# Patient Record
(demographics unavailable — no encounter records)

---

## 2017-03-08 NOTE — MM
Reason for exam: screening  (asymptomatic).

Last mammogram was performed 1 year and 1 month ago.



History:

Family history of premenopausal breast cancer in maternal cousin at age 41, breast

cancer in maternal cousin at age 41, and premenopausal breast cancer in maternal 

grandmother at age 40.

Took hormonal contraceptives for 2 years.



Physical Findings:

A clinical breast exam by your physician is recommended on an annual basis and 

results should be correlated with mammographic findings.



MG Screening Mammo w CAD

Bilateral CC and MLO view(s) were taken.

Prior study comparison: January 29, 2016, bilateral MG screening mammo w CAD.  

December 9, 2014, bilateral MG screening mammo w CAD.

The breast tissue is heterogeneously dense. This may lower the sensitivity of 

mammography.  There is chronic nodularity bilaterally.  No significant changes 

when compared with prior studies.





ASSESSMENT: Benign, BI-RAD 2



RECOMMENDATION:

Routine screening mammogram of both breasts in 1 year.

## 2017-08-02 NOTE — P.PCN
Date of Procedure: 08/02/17


Preoperative Diagnosis: 





Postoperative Diagnosis: 





Procedure(s) Performed: 


BRIEF HISTORY: Patient is a 50-year-old pleasant white female, scheduled for an 

elective colonoscopy as a part of screening for colorectal neoplasia.





PROCEDURE PERFORMED: Colonoscopy. 





PREOPERATIVE DIAGNOSIS: Screening for colon cancer. 





IV sedation per Anesthesia. 





PROCEDURE: After informed consent was obtained, the patient, was brought into 

the endoscopy unit. IV sedation was administered by Anesthesia under continuous 

monitoring.  Digital rectal examination was normal. Initially the Olympus CF-

160 flexible video colonoscope was then inserted in the rectum, gradually 

advanced into the cecum without any difficulty. Careful examination was 

performed as the scope was gradually being withdrawn. Ileocecal valve and the 

appendiceal orifice were visualized and appeared normal.  Prep was excellent. 

Mucosa of the cecum, ascending colon, transverse colon, descending colon, 

sigmoid colon, and rectum appeared normal. Retroflexion was performed in the 

rectum and no lesions were seen. The patient tolerated the procedure well. 





IMPRESSION: 


Normal-appearing colon from rectum to cecum with no evidence of colorectal 

neoplasia.





RECOMMENDATIONS:  Findings of this examination were discussed with the patient 

as well as her family.  She was advised to have a repeat screening colonoscopy 

in 10 years





Implants: 





Indications for Procedure: 





Operative Findings: 





Description of Procedure:

## 2018-04-04 NOTE — BD
EXAMINATION TYPE: MG DEXA axial skeleton.  

 

DATE OF EXAM: 4/4/2018

 

COMPARISON: NONE

 

CLINICAL HISTORY:

 

Height:  5 FT 5 IN

Weight:  169

 

FRAX RISK QUESTIONS:

Alcohol (3 or more units per day):  NO

Family History (Parent hip fracture):  NO

Glucocorticoids (More than 3mos):  NO

           (Ex: prednisone, prednisolone, methylprednisolone, dexamethasone, and hydrocortisone).    
     

History of Fracture in Adulthood: NO

Secondary Osteoporosis:

  1.  Type 1 Diabetes: NO

  2.  Hyperthyroidism: NO

  3.  Menopause before 45: NO

  4.  Malnutrition: NO

  5.  Chronic liver disease: NO

Rheumatoid Arthritis: NO

Current Tobacco Use: NO

 

RISK FACTORS 

HISTORY OF: 

Active: YES

Postmenopausal woman: AGE 50

 

MEDICATIONS: 

Additional Medications: VIT D ,LISINOPRIL, MECLIZINE

 

 

Additional History: VERTIGO

 

 

EXAM MEASUREMENTS: 

Bone mineral densitometry was performed using the [employer] System.

Bone mineral density as measured about the Lumbar spine is:  

----- L1-L4(G/cm2): 0.999

T Score Values are as follows:

----- L2: -1.6

----- L3: -1.3

----- L4: -1.4

----- L1-L4: -1.5

BASELINE

 

Bone mineral density about the R hip (g/cm2): 0.884

Bone mineral density about the L hip (g/cm2): 0.866

T Score values are as follows:

-----R Neck: -1.1

-----L Neck: -1.2

-----R Total: -0.6

-----L Total: -1.0

BASELINE

 

IMPRESSION:

Osteopenia (T Score between -2.5 and -1).

 

There is slightly increased risk of fracture and the patient may be considered 

for treatment. 

 

Re-Screen 2-5 years.

 

 

 

 

 

NOTE:  T-SCORE=SD OF THE YOUNG ADULT MEAN.

## 2018-04-06 NOTE — ECHOF
Referral Reason:R94.31 Abnormal EKG



MEASUREMENTS

--------

HEIGHT: 167.6 cm

WEIGHT: 77.1 kg

BP: 

RVIDd:   2.7 cm     (< 3.3)

IVSd:   1.0 cm     (0.6 - 1.1)

LVIDd:   4.3 cm     (3.9 - 5.3)

LVPWd:   1.0 cm     (0.6 - 1.1)

IVSs:   1.6 cm

LVIDs:   2.4 cm

LVPWs:   1.6 cm

LAESV Index (A-L):   11.16 ml/m

Ao Diam:   2.7 cm     (2.0 - 3.7)

AV Cusp:   1.8 cm     (1.5 - 2.6)

LA Diam:   2.3 cm     (2.7 - 3.8)

MV E Barber:   0.51 m/s

MV DecT:   291 ms

MV A Barber:   0.80 m/s

MV E/A Ratio:   0.64 

RAP:   5.00 mmHg

RVSP:   11.21 mmHg







FINDINGS

--------

Sinus rhythm.

This was a technically adequate study.

The left ventricular size is normal.   Left ventricular wall thickness is normal.   Overall left vent
ricular systolic function is normal with, an EF between 55 - 60 %.

The right ventricle is normal in size and function.

Normal LA  size by volume 22+/-6 ml/m2.

The right atrium is normal in size.

The aortic valve is trileaflet, and appears structurally normal. No aortic stenosis or regurgitation.


The mitral valve is normal.   There is trace to mild mitral regurgitation.

Trace tricuspid regurgitation present.   Right ventricular systolic pressure is normal at < 35 mmHg. 
  There is no evidence of pulmonary hypertension.

The pulmonic valve is normal.

The aortic root size is normal.

Normal inferior vena cava with normal inspiratory collapse consistent with estimated right atrial pre
ssure of  5 mmHg.

The pericardium is normal.   There is no pericardial effusion.



CONCLUSIONS

--------

1. Sinus rhythm.

2. This was a technically adequate study.

3. The left ventricular size is normal.

4. Left ventricular wall thickness is normal.

5. Overall left ventricular systolic function is normal with, an EF between 55 - 60 %.

6. Normal LA size by volume 22+/-6 ml/m2.

7. The aortic valve is trileaflet, and appears structurally normal. No aortic stenosis or regurgitati
on.

8. There is trace to mild mitral regurgitation.

9. Trace tricuspid regurgitation present.

10. Right ventricular systolic pressure is normal at < 35 mmHg.

11. There is no evidence of pulmonary hypertension.

12. The aortic root size is normal.

13. There is no pericardial effusion.





SONOGRAPHER: Francisco Kelly RDCS

## 2018-04-06 NOTE — MM
Reason for exam: screening  (asymptomatic).

Last mammogram was performed 1 year and 1 month ago.



History:

Family history of premenopausal breast cancer in maternal cousin at age 41, breast

cancer in maternal cousin at age 41, and premenopausal breast cancer in maternal 

grandmother at age 40.

Took hormonal contraceptives for 2 years.



Physical Findings:

A clinical breast exam by your physician is recommended on an annual basis and 

results should be correlated with mammographic findings.



MG Screening Mammo w CAD

Bilateral CC and MLO view(s) were taken.

Prior study comparison: March 7, 2017, bilateral MG screening mammo w CAD.  

January 29, 2016, bilateral MG screening mammo w CAD.

The breast tissue is heterogeneously dense. This may lower the sensitivity of 

mammography.  No suspicious abnormality.  No significant changes when compared 

with prior studies.





ASSESSMENT: Negative, BI-RAD 1



RECOMMENDATION:

Routine screening mammogram of both breasts in 1 year.